# Patient Record
Sex: MALE | Race: WHITE | NOT HISPANIC OR LATINO | Employment: FULL TIME | URBAN - METROPOLITAN AREA
[De-identification: names, ages, dates, MRNs, and addresses within clinical notes are randomized per-mention and may not be internally consistent; named-entity substitution may affect disease eponyms.]

---

## 2024-09-19 ENCOUNTER — OFFICE VISIT (OUTPATIENT)
Dept: DERMATOLOGY | Facility: CLINIC | Age: 44
End: 2024-09-19
Payer: COMMERCIAL

## 2024-09-19 DIAGNOSIS — D18.01 ANGIOMA OF SKIN: ICD-10-CM

## 2024-09-19 DIAGNOSIS — L72.9 CYST OF SKIN: Primary | ICD-10-CM

## 2024-09-19 DIAGNOSIS — L82.1 SEBORRHEIC KERATOSIS: ICD-10-CM

## 2024-09-19 PROCEDURE — 99203 OFFICE O/P NEW LOW 30 MIN: CPT | Performed by: PHYSICIAN ASSISTANT

## 2024-09-19 NOTE — PROGRESS NOTES
HPI:   Chief complaints: Sloan Argueta is a pleasant 43 year old male who presents for evaluation of several spots of concerns. He has a larger bump on the right cheek present for a few weeks along with smaller bumps on the right cheek as well. He has a scaly bump on the left forehead present for several months. Lastly he has a bump on the upper back which is red. He does not have a history of skin cancer.       PHYSICAL EXAM:    There were no vitals taken for this visit.  Skin exam performed as follows: Type 2 skin. Mood appropriate  Alert and Oriented X 3. Well developed, well nourished in no distress.  General appearance: Normal  Head including face: Normal  Eyes: conjunctiva and lids: Normal  Mouth: Lips, teeth, gums: Normal  Neck: Normal  Skin: Scalp and body hair: See below    11 mm smooth mobile subcutaneous nodule on the right preauricular cheek; smaller papule on the right lateral cheek x 1, right lower cheek  Inflamed keratotic papule on the left upper forehead, right temple  Vascular papule on the mid upper back    ASSESSMENT/PLAN:     Cysts on the right PA cheek, right lateral cheek, right lower cheek - discussed excision with Dr Avila in the future if desired.   Seborrheic keratoses on the left upper forehead, right temple advised benign no treatment needed.   Angioma on the mid upper back - advised benign          Follow-up: PRN  CC:   Scribed By: Pearl Lynne MS, PABrynC

## 2024-09-19 NOTE — LETTER
9/19/2024      Sloan Argueta  [address] [place] [id number]      Dear Colleague,    Thank you for referring your patient, Sloan Argueta, to the [facility]. Please see a copy of my visit note below.    HPI:   Chief complaints: Sloan Argueta is a pleasant 43 year old male who presents for evaluation of several spots of concerns. He has a larger bump on the right cheek present for a few weeks along with smaller bumps on the right cheek as well. He has a scaly bump on the left forehead present for several months. Lastly he has a bump on the upper back which is red. He does not have a history of skin cancer.       PHYSICAL EXAM:    There were no vitals taken for this visit.  Skin exam performed as follows: Type 2 skin. Mood appropriate  Alert and Oriented X 3. Well developed, well nourished in no distress.  General appearance: Normal  Head including face: Normal  Eyes: conjunctiva and lids: Normal  Mouth: Lips, teeth, gums: Normal  Neck: Normal  Skin: Scalp and body hair: See below    11 mm smooth mobile subcutaneous nodule on the right preauricular cheek; smaller papule on the right lateral cheek x 1, right lower cheek  Inflamed keratotic papule on the left upper forehead, right temple  Vascular papule on the mid upper back    ASSESSMENT/PLAN:     Cysts on the right PA cheek, right lateral cheek, right lower cheek - discussed excision with Dr Avila in the future if desired.   Seborrheic keratoses on the left upper forehead, right temple advised benign no treatment needed.   Angioma on the mid upper back - advised benign          Follow-up: PRN  CC:   Scribed By: Pearl Villegas, MS, PACARTER      Again, thank you for allowing me to participate in the care of your patient.        Sincerely,        Pearl Villegas PA-C